# Patient Record
Sex: FEMALE | Race: WHITE | NOT HISPANIC OR LATINO | URBAN - NONMETROPOLITAN AREA
[De-identification: names, ages, dates, MRNs, and addresses within clinical notes are randomized per-mention and may not be internally consistent; named-entity substitution may affect disease eponyms.]

---

## 2021-02-05 ENCOUNTER — OFFICE VISIT CONVERTED (OUTPATIENT)
Dept: NEUROSURGERY | Facility: CLINIC | Age: 41
End: 2021-02-05
Attending: PHYSICIAN ASSISTANT

## 2021-05-10 NOTE — H&P
History and Physical      Patient Name: Lane Garcia   Patient ID: [id number]   Sex: Male   YOB: 1980    Primary Care Provider: Raymond Lugo PA-C   Referring Provider: Raymond Lugo PA-C    Visit Date: February 5, 2021    Provider: Alexa Coffman PA-C   [place] [facility]   Location Address: [address] [place] [address]  [id number]   Location Phone: [phone]          Chief Complaint  · Mid back      History Of Present Illness  The patient is a 40 year old /White male who is seen in consultation at the request of Raymond Lugo PA-C for evaluation of mid back pain, neck pain, kyphosis, and syrinx mid-back region pain. He states the pain is moderate in severity, is constant and has a aching quality. It began gradually several years ago. The pain does not radiate.   He also complains of neck pain and headaches. Has some paresthesias in the hands and feet.. The patient states the pain is aggravated by sitting up straight. The patient has not identified and alleviating factors.   The patient's past medical history is notable for kyphosis.   Recent Interventions  He has been previously treated with physical therapy, chiropractic management, and NSAIDs. The physical therapy was ineffective in relieving the pain. The chiropractic treatments were ineffective. The NSAIDS were ineffective.   Information Reviewed  The following information was reviewed radiology reports and images. A MRI of the thoracic spine revealed syrinx from T7 to T10. No syrinx on MRI cervical spine. Both with and without contrast. MRI brain normal on report from June 2020. She has kyphosis in the thoracic spine. These were the most notable findings.       Past Medical History  Asthma; Back pain; Bladder problem; Bradycardia; Degenerative Disc Disease ; Kyphosis; Migraine; Muscle cramp; Scoliosis         Past Surgical History  Cystoscopy; D and C; Gallbladder;  "Hysterectomy; Thompson Tooth Extraction         Medication List  estrogens-methyltestosterone 0.625-1.25 mg oral tablet; ibuprofen 200 mg oral capsule; Imitrex 50 mg oral tablet; meclizine 25 mg oral tablet; promethazine 25 mg oral tablet         Allergy List  NO KNOWN DRUG ALLERGIES       Allergies Reconciled  Family Medical History  *No Known Family History         Social History  Tobacco (Never)         Review of Systems  · Constitutional  o Admits  o : excessive sweating, fatigue, sycope/passing out  o Denies  o : chills, fever, weight gain, weight loss  · Eyes  o Admits  o : changes in vision  o Denies  o : blurry vision, double vision  · HENT  o Admits  o : seasonal allergies  o Denies  o : loss of hearing, ringing in the ears, ear aches, sore throat, nasal congestion, sinus pain, nose bleeds  · Cardiovascular  o Denies  o : blood clots, swollen legs, anemia, easy burising or bleeding, transfusions  · Respiratory  o Denies  o : shortness of breath, dry cough, productive cough, pneumonia, COPD  · Gastrointestinal  o Admits  o : reflux  o Denies  o : difficulty swallowing  · Genitourinary  o Admits  o : incontinence  · Neurologic  o Admits  o : headache, tremor, loss of balance, falls, dizziness/vertigo, numbness/tingling/paresthesia , difficulty with dexterity  o Denies  o : seizure, difficulty with sleep, difficulty with coordination, weakness  · Musculoskeletal  o Admits  o : muscle aches, low back pain, mid back pain  o Denies  o : neck stiffness/pain, swollen lymph nodes, joint pain, weakness, spasms, sciatica, pain radiating in arm, pain radiating in leg  · Endocrine  o Denies  o : diabetes, thyroid disorder  · Psychiatric  o Admits  o : anxiety  o Denies  o : depression  · All Others Negative      Vitals  Date Time BP Position Site L\R Cuff Size HR RR TEMP (F) WT  HT  BMI kg/m2 BSA m2 O2 Sat FR L/min FiO2 HC       02/05/2021 02:18 PM        97 155lbs 0oz 5'  5\" 25.79 1.8             Physical " Examination  · Constitutional  o Appearance  o : well nourished, well developed, alert, oriented, in no acute distress  · Respiratory  o Respiratory Effort  o : breathing unlabored, no accessory muscle use  o Inspection of Chest  o : normal appearance, no retractions  · Cardiovascular  o Peripheral Vascular System  o :   § Extremities  § : no cyanosis, clubbing or edema  · Musculoskeletal  o Spine  o :   § Stability  § : kyphosis  § Range of Motion  § : range of motion normal  · Neurologic  o Mental Status Examination  o :   § Orientation  § : grossly oriented to person, place and time  o Motor Examination  o :   § RLE Strength  § : strength normal  § RLE Motor Function  § : tone normal, no atrophy, no abnormal movements noted  § LLE Strength  § : strength normal  § LLE Motor Function  § : tone normal, no atrophy, no abnormal movements noted  o Reflexes  o :   § RLE  § : negative Abbott's and no clonus  § LLE  § : negative Abbott's and no clonus  o Sensation  o :   § Light Touch  § : sensation intact to light touch in extremities  o Gait and Station  o :   § Gait Screening  § : normal gait, able to stand without difficulty  · Psychiatric  o Mood and Affect  o : mood normal, affect appropriate     Motor 5/5 in upper extremities           Assessment  · Syrinx of spinal cord     336.0/G95.0  thoracic spine  · Mid back pain     724.5/M54.9  · Cervicalgia     723.1/M54.2  · Kyphosis     737.10/M40.209      Plan  · Medications  o Medications have been Reconciled  o Transition of Care or Provider Policy  · Instructions  o Encouraged to follow-up with Primary Care Provider for preventative care.  o The ROS and the PFSH were reviewed at today's visit.  o Call or return to office if symptoms persist or worsen.   o She has a mid thoracic syrinx. No compressive lesion in the brain, cervical, or thoracic spine. No further work up needed for this issue. She may consider pain management referral for her discomfort. Declined  referral at this time. F/U as needed. Surgery not indicated.             Electronically Signed by: Alexa Coffman PA-C -Author on February 5, 2021 03:01:29 PM

## 2021-05-14 VITALS — BODY MASS INDEX: 25.83 KG/M2 | HEIGHT: 65 IN | WEIGHT: 155 LBS | TEMPERATURE: 97 F
